# Patient Record
Sex: FEMALE | Race: WHITE | Employment: FULL TIME | URBAN - METROPOLITAN AREA
[De-identification: names, ages, dates, MRNs, and addresses within clinical notes are randomized per-mention and may not be internally consistent; named-entity substitution may affect disease eponyms.]

---

## 2021-06-28 ENCOUNTER — HOSPITAL ENCOUNTER (EMERGENCY)
Age: 31
Discharge: HOME | End: 2021-06-29
Attending: STUDENT IN AN ORGANIZED HEALTH CARE EDUCATION/TRAINING PROGRAM

## 2021-06-28 DIAGNOSIS — T78.40XA ALLERGIC REACTION, INITIAL ENCOUNTER: Primary | ICD-10-CM

## 2021-06-28 PROCEDURE — 96375 TX/PRO/DX INJ NEW DRUG ADDON: CPT

## 2021-06-28 PROCEDURE — 99282 EMERGENCY DEPT VISIT SF MDM: CPT

## 2021-06-28 PROCEDURE — 96374 THER/PROPH/DIAG INJ IV PUSH: CPT

## 2021-06-28 RX ORDER — SPIRONOLACTONE 50 MG/1
50 TABLET, FILM COATED ORAL DAILY
COMMUNITY

## 2021-06-28 ASSESSMENT — PAIN SCALES - GENERAL: PAINLEVEL_OUTOF10: 4

## 2021-06-29 VITALS
OXYGEN SATURATION: 94 % | TEMPERATURE: 97.7 F | SYSTOLIC BLOOD PRESSURE: 141 MMHG | RESPIRATION RATE: 20 BRPM | DIASTOLIC BLOOD PRESSURE: 100 MMHG | HEART RATE: 94 BPM

## 2021-06-29 PROCEDURE — 6360000002 HC RX W HCPCS: Performed by: STUDENT IN AN ORGANIZED HEALTH CARE EDUCATION/TRAINING PROGRAM

## 2021-06-29 PROCEDURE — 2580000003 HC RX 258: Performed by: STUDENT IN AN ORGANIZED HEALTH CARE EDUCATION/TRAINING PROGRAM

## 2021-06-29 PROCEDURE — 2500000003 HC RX 250 WO HCPCS: Performed by: STUDENT IN AN ORGANIZED HEALTH CARE EDUCATION/TRAINING PROGRAM

## 2021-06-29 RX ORDER — 0.9 % SODIUM CHLORIDE 0.9 %
1000 INTRAVENOUS SOLUTION INTRAVENOUS ONCE
Status: COMPLETED | OUTPATIENT
Start: 2021-06-29 | End: 2021-06-29

## 2021-06-29 RX ORDER — EPINEPHRINE 0.3 MG/.3ML
0.3 INJECTION SUBCUTANEOUS ONCE
Qty: 0.3 ML | Refills: 0 | Status: SHIPPED | OUTPATIENT
Start: 2021-06-29 | End: 2021-06-29

## 2021-06-29 RX ORDER — FAMOTIDINE 20 MG/1
20 TABLET, FILM COATED ORAL 2 TIMES DAILY
Qty: 60 TABLET | Refills: 0 | Status: SHIPPED | OUTPATIENT
Start: 2021-06-29

## 2021-06-29 RX ORDER — DIPHENHYDRAMINE HYDROCHLORIDE 50 MG/ML
25 INJECTION INTRAMUSCULAR; INTRAVENOUS ONCE
Status: COMPLETED | OUTPATIENT
Start: 2021-06-29 | End: 2021-06-29

## 2021-06-29 RX ADMIN — SODIUM CHLORIDE 1000 ML: 9 INJECTION, SOLUTION INTRAVENOUS at 00:35

## 2021-06-29 RX ADMIN — FAMOTIDINE 20 MG: 10 INJECTION, SOLUTION INTRAVENOUS at 00:37

## 2021-06-29 RX ADMIN — DIPHENHYDRAMINE HYDROCHLORIDE 25 MG: 50 INJECTION INTRAMUSCULAR; INTRAVENOUS at 00:37

## 2021-06-29 ASSESSMENT — ENCOUNTER SYMPTOMS
VOMITING: 0
VOICE CHANGE: 0
NAUSEA: 0
FACIAL SWELLING: 1
TROUBLE SWALLOWING: 0
COLOR CHANGE: 1

## 2021-06-29 NOTE — ED PROVIDER NOTES
[facility]      Pt Name: Ghanshyam Cervantes  MRN: [id number]  [place] 1990  Date of evaluation: 6/28/2021  Provider: Armando Perkins MD    CHIEF COMPLAINT       Chief Complaint   Patient presents with    Allergic Reaction     hair done on saturday. eye swelling and facial swelling started today. airway patent. HISTORY OF PRESENT ILLNESS   (Location/Symptom, Timing/Onset, Context/Setting, Quality, Duration, Modifying Factors, Severity)  Note limiting factors. Ghanshyam Cervantes is a 26 y.o. female who presents to the emergency department with facial swelling worsening over the last 24 hours. The facial swelling started on Saturday after she had her hair dyed. This has happened to her once before but she is not sure if she used the same hair dye. Swelling is worsening around both her eyes. She is not having any trouble swallowing or breathing. No voice change. She is having an itching rash along her hairline. she was seen in urgent care today for the same. She received a shot which she assumes were steroids in her arm. She also took steroid pills at home and has been taking Benadryl every 4 hours. The swelling has somewhat worsened over the last 24 hours. HPI    Nursing Notes were reviewed. REVIEW OF SYSTEMS    (2-9 systems for level 4, 10 or more for level 5)     Review of Systems   Constitutional: Negative for chills and fever. HENT: Positive for facial swelling. Negative for trouble swallowing and voice change. Gastrointestinal: Negative for nausea and vomiting. Skin: Positive for color change and rash. Except as noted above the remainder of the review of systems was reviewed and negative. PAST MEDICAL HISTORY   History reviewed. No pertinent past medical history.       SURGICAL HISTORY       Past Surgical History:   Procedure Laterality Date    KNEE SURGERY           CURRENT MEDICATIONS       Discharge Vitals   BP Temp Temp src Pulse Resp SpO2 Height Weight   -- -- -- -- -- -- -- --       Physical Exam  Constitutional:       Appearance: Normal appearance. HENT:      Head: Normocephalic and atraumatic. Mouth/Throat:      Comments: No posterior oral edema. No swelling along the lips or tongue. No stridor or trismus. Eyes:      Conjunctiva/sclera: Conjunctivae normal.      Comments: Marked periorbital edema bilaterally. Cardiovascular:      Rate and Rhythm: Normal rate and regular rhythm. Pulses: Normal pulses. Heart sounds: Normal heart sounds. Pulmonary:      Effort: Pulmonary effort is normal.      Breath sounds: Normal breath sounds. Abdominal:      General: Abdomen is flat. There is no distension. Palpations: Abdomen is soft. There is no mass. Tenderness: There is no abdominal tenderness. Musculoskeletal:      Cervical back: Normal range of motion. No rigidity. Skin:     General: Skin is warm and dry. Capillary Refill: Capillary refill takes less than 2 seconds. Neurological:      Mental Status: She is alert and oriented to person, place, and time. Psychiatric:         Mood and Affect: Mood normal.         Behavior: Behavior normal.         DIAGNOSTIC RESULTS           RADIOLOGY:   Non-plain film images such as CT, Ultrasound and MRI are read by the radiologist. Plain radiographic images are visualized and preliminarily interpreted by the emergency physician with the below findings:      Interpretation per the Radiologist below, if available at the time of this note:    No orders to display         LABS:  Labs Reviewed - No data to display    All other labs were within normal range or not returned as of this dictation.     EMERGENCY DEPARTMENT COURSE and DIFFERENTIAL DIAGNOSIS/MDM:   Vitals:    Vitals:    06/28/21 2344 06/28/21 2345 06/29/21 0000   BP: 134/67 (!) 140/91 (!) 141/100   Pulse: 94     Resp: 20     Temp: 97.7 °F (36.5 °C)     TempSrc: Oral     SpO2: 97% 97% 94%     Medications   0.9 % sodium chloride bolus (0 mLs Intravenous Stopped 6/29/21 0206)   famotidine (PEPCID) injection 20 mg (20 mg Intravenous Given 6/29/21 0037)   diphenhydrAMINE (BENADRYL) injection 25 mg (25 mg Intravenous Given 6/29/21 0037)       Patient is 57-year-old female presenting to the emergency room for facial swelling for 3 days after using hair dye. No signs of airway compromise on arrival.  She is hemodynamically stable and comfortable. She has periorbital edema bilaterally. Her presentation is not consistent with anaphylaxis. She is already status post intramuscular steroids and Benadryl today. She was observed in the emergency room for 3 hours and treated with IV fluids and Pepcid here. On repeat evaluation her vitals remained stable and her facial swelling has improved. We will treat for allergic reaction at home with EpiPen, q6 Benadryl and continued steroid burst which has already been prescribed. I also prescribed Pepcid for home-going. She is to return for any shortness of breath chest tightness or throat tightness. Otherwise stable for PCP follow-up in 1 week. She is agreeable. PROCEDURES:  Unless otherwise noted below, none     Procedures      FINAL IMPRESSION      1. Allergic reaction, initial encounter          DISPOSITION/PLAN   DISPOSITION        PATIENT REFERRED TO:  No follow-up provider specified. DISCHARGE MEDICATIONS:      Discharge Medication List as of 6/29/2021  1:59 AM      START taking these medications    Details   famotidine (PEPCID) 20 MG tablet Take 1 tablet by mouth 2 times daily, Disp-60 tablet, R-0Print      EPINEPHrine (EPIPEN 2-ADENIKE) 0.3 MG/0.3ML SOAJ injection Inject 0.3 mLs into the muscle once for 1 dose Use as directed for allergic reaction, Disp-0.3 mL, R-0Print             Controlled Substances Monitoring:    If the patient was prescribed a controlled substance today, the PDMP was reviewed as documented below.     No flowsheet data found.    (Please note that portions of this note were completed with a voice recognition program.  Efforts were made to edit the dictations but occasionally words are mis-transcribed.)    Mercedes Montero MD (electronically signed)  Attending Emergency Physician         Justin Olmstead MD  06/29/21 7300

## 2022-01-19 ENCOUNTER — TELEPHONE (OUTPATIENT)
Dept: ENDOCRINOLOGY | Age: 32
End: 2022-01-19

## 2022-01-19 ENCOUNTER — TELEPHONE (OUTPATIENT)
Dept: RHEUMATOLOGY | Age: 32
End: 2022-01-19

## 2022-01-19 NOTE — TELEPHONE ENCOUNTER
lvm to call back  Not sure if this referral should go to [facility] or [facility] rheumatology???     Rheumatology Diagnoses    Elevated sed rate    [facility], Stephania Gomez PA-C    [address] [facility],  [address]   Phone: [phone]    Fax: [phone]   Karina Smith MD    [address] [place] [address]   [place], [address]   Phone: [phone]    Fax: [phone]       Consultation (Routine) - Authorized   Referral ID Status Reason Start Date Expiration Date Visits Requested Visits Authorized Modifications were made to above trainee note where appropriate and/or are addressed below in case summary.